# Patient Record
(demographics unavailable — no encounter records)

---

## 2025-05-08 NOTE — HISTORY OF PRESENT ILLNESS
[de-identified] : Patient presents to the office today for follow-up to check thyroid Patient still suffers with back pain was using Robaxin but was having constipation with medication she has used baclofen in the past with no problems and is requesting to change medication Patient has history of intermittent stye in left eye uses erythromycin ointment that does help to resolve Patient is feeling well on current thyroid dose no complaints of side effects with medication

## 2025-05-08 NOTE — PLAN
[FreeTextEntry1] : Patient on blood work done for thyroid levels today medications will be refilled Patient will change from Robaxin to baclofen as she has had constipation with the Robaxin

## 2025-05-14 NOTE — REVIEW OF SYSTEMS
[Hoarseness] : hoarseness [Shortness Of Breath] : no shortness of breath [Wheezing] : no wheezing [Cough] : cough [Negative] : Cardiovascular

## 2025-05-14 NOTE — PLAN
[FreeTextEntry1] : COVID and Influenza testing negative  Pt will start abx given above and go for chest xray for further evaluation

## 2025-05-14 NOTE — HISTORY OF PRESENT ILLNESS
[FreeTextEntry8] : Pt presents to the office today with chest congestion and cough for the last 3 days.   is in hospital with Pneumonia and she has been at the hospital multiple times